# Patient Record
Sex: FEMALE | Race: WHITE | NOT HISPANIC OR LATINO | Employment: FULL TIME | URBAN - METROPOLITAN AREA
[De-identification: names, ages, dates, MRNs, and addresses within clinical notes are randomized per-mention and may not be internally consistent; named-entity substitution may affect disease eponyms.]

---

## 2017-01-09 ENCOUNTER — APPOINTMENT (OUTPATIENT)
Dept: ADMISSIONS | Facility: MEDICAL CENTER | Age: 32
End: 2017-01-09
Attending: SPECIALIST
Payer: COMMERCIAL

## 2017-01-09 RX ORDER — ZOLPIDEM TARTRATE 10 MG/1
10 TABLET ORAL NIGHTLY PRN
COMMUNITY

## 2017-01-12 ENCOUNTER — HOSPITAL ENCOUNTER (OUTPATIENT)
Facility: MEDICAL CENTER | Age: 32
End: 2017-01-12
Attending: SPECIALIST | Admitting: SPECIALIST
Payer: COMMERCIAL

## 2017-01-12 VITALS
HEART RATE: 61 BPM | BODY MASS INDEX: 24.39 KG/M2 | SYSTOLIC BLOOD PRESSURE: 115 MMHG | TEMPERATURE: 98.8 F | DIASTOLIC BLOOD PRESSURE: 69 MMHG | WEIGHT: 155.42 LBS | OXYGEN SATURATION: 95 % | RESPIRATION RATE: 18 BRPM | HEIGHT: 67 IN

## 2017-01-12 PROBLEM — Z41.1 ENCOUNTER FOR COSMETIC PROCEDURE: Status: ACTIVE | Noted: 2017-01-12

## 2017-01-12 LAB
HCG UR QL: NEGATIVE
SP GR UR REFRACTOMETRY: 1.02

## 2017-01-12 PROCEDURE — 700111 HCHG RX REV CODE 636 W/ 250 OVERRIDE (IP): Performed by: SPECIALIST

## 2017-01-12 PROCEDURE — 500817 HCHG MAMMARY SIZER: Performed by: SPECIALIST

## 2017-01-12 PROCEDURE — 502575 HCHG PACK, BREAST: Performed by: SPECIALIST

## 2017-01-12 PROCEDURE — A4606 OXYGEN PROBE USED W OXIMETER: HCPCS | Performed by: SPECIALIST

## 2017-01-12 PROCEDURE — 110371 HCHG SHELL REV 272: Performed by: SPECIALIST

## 2017-01-12 PROCEDURE — 160047 HCHG PACU  - EA ADDL 30 MINS PHASE II: Performed by: SPECIALIST

## 2017-01-12 PROCEDURE — 500122 HCHG BOVIE, BLADE: Performed by: SPECIALIST

## 2017-01-12 PROCEDURE — 501838 HCHG SUTURE GENERAL: Performed by: SPECIALIST

## 2017-01-12 PROCEDURE — 160046 HCHG PACU - 1ST 60 MINS PHASE II: Performed by: SPECIALIST

## 2017-01-12 PROCEDURE — A9270 NON-COVERED ITEM OR SERVICE: HCPCS

## 2017-01-12 PROCEDURE — 160028 HCHG SURGERY MINUTES - 1ST 30 MINS LEVEL 3: Performed by: SPECIALIST

## 2017-01-12 PROCEDURE — 81025 URINE PREGNANCY TEST: CPT

## 2017-01-12 PROCEDURE — 160035 HCHG PACU - 1ST 60 MINS PHASE I: Performed by: SPECIALIST

## 2017-01-12 PROCEDURE — 110382 HCHG SHELL REV 271: Performed by: SPECIALIST

## 2017-01-12 PROCEDURE — 700111 HCHG RX REV CODE 636 W/ 250 OVERRIDE (IP)

## 2017-01-12 PROCEDURE — 160039 HCHG SURGERY MINUTES - EA ADDL 1 MIN LEVEL 3: Performed by: SPECIALIST

## 2017-01-12 PROCEDURE — 500054 HCHG BANDAGE, ELASTIC 6: Performed by: SPECIALIST

## 2017-01-12 PROCEDURE — 700102 HCHG RX REV CODE 250 W/ 637 OVERRIDE(OP)

## 2017-01-12 PROCEDURE — 700101 HCHG RX REV CODE 250

## 2017-01-12 PROCEDURE — 160009 HCHG ANES TIME/MIN: Performed by: SPECIALIST

## 2017-01-12 PROCEDURE — 160002 HCHG RECOVERY MINUTES (STAT): Performed by: SPECIALIST

## 2017-01-12 PROCEDURE — 160025 RECOVERY II MINUTES (STATS): Performed by: SPECIALIST

## 2017-01-12 PROCEDURE — 160036 HCHG PACU - EA ADDL 30 MINS PHASE I: Performed by: SPECIALIST

## 2017-01-12 PROCEDURE — 160048 HCHG OR STATISTICAL LEVEL 1-5: Performed by: SPECIALIST

## 2017-01-12 DEVICE — IMPLANTABLE DEVICE: Type: IMPLANTABLE DEVICE | Status: FUNCTIONAL

## 2017-01-12 RX ORDER — CEFAZOLIN SODIUM 1 G/3ML
INJECTION, POWDER, FOR SOLUTION INTRAMUSCULAR; INTRAVENOUS
Status: DISCONTINUED | OUTPATIENT
Start: 2017-01-12 | End: 2017-01-12 | Stop reason: HOSPADM

## 2017-01-12 RX ORDER — SODIUM CHLORIDE, SODIUM LACTATE, POTASSIUM CHLORIDE, CALCIUM CHLORIDE 600; 310; 30; 20 MG/100ML; MG/100ML; MG/100ML; MG/100ML
INJECTION, SOLUTION INTRAVENOUS
Status: DISCONTINUED | OUTPATIENT
Start: 2017-01-12 | End: 2017-01-12 | Stop reason: HOSPADM

## 2017-01-12 RX ORDER — BUPIVACAINE HYDROCHLORIDE AND EPINEPHRINE 2.5; 5 MG/ML; UG/ML
INJECTION, SOLUTION EPIDURAL; INFILTRATION; INTRACAUDAL; PERINEURAL
Status: DISCONTINUED | OUTPATIENT
Start: 2017-01-12 | End: 2017-01-12 | Stop reason: HOSPADM

## 2017-01-12 RX ORDER — OXYCODONE HCL 5 MG/5 ML
SOLUTION, ORAL ORAL
Status: COMPLETED
Start: 2017-01-12 | End: 2017-01-12

## 2017-01-12 RX ORDER — MIDAZOLAM HYDROCHLORIDE 1 MG/ML
INJECTION INTRAMUSCULAR; INTRAVENOUS
Status: DISCONTINUED
Start: 2017-01-12 | End: 2017-01-12 | Stop reason: HOSPADM

## 2017-01-12 RX ORDER — GENTAMICIN SULFATE 40 MG/ML
INJECTION, SOLUTION INTRAMUSCULAR; INTRAVENOUS
Status: DISCONTINUED | OUTPATIENT
Start: 2017-01-12 | End: 2017-01-12 | Stop reason: HOSPADM

## 2017-01-12 RX ORDER — BACITRACIN 50000 [IU]/1
INJECTION, POWDER, FOR SOLUTION INTRAMUSCULAR
Status: DISCONTINUED | OUTPATIENT
Start: 2017-01-12 | End: 2017-01-12 | Stop reason: HOSPADM

## 2017-01-12 RX ADMIN — FENTANYL CITRATE 25 MCG: 50 INJECTION, SOLUTION INTRAMUSCULAR; INTRAVENOUS at 15:58

## 2017-01-12 RX ADMIN — FENTANYL CITRATE 50 MCG: 50 INJECTION, SOLUTION INTRAMUSCULAR; INTRAVENOUS at 15:34

## 2017-01-12 RX ADMIN — SODIUM CHLORIDE, POTASSIUM CHLORIDE, SODIUM LACTATE AND CALCIUM CHLORIDE: 600; 310; 30; 20 INJECTION, SOLUTION INTRAVENOUS at 11:25

## 2017-01-12 RX ADMIN — FENTANYL CITRATE 25 MCG: 50 INJECTION, SOLUTION INTRAMUSCULAR; INTRAVENOUS at 16:50

## 2017-01-12 RX ADMIN — FENTANYL CITRATE 25 MCG: 50 INJECTION, SOLUTION INTRAMUSCULAR; INTRAVENOUS at 15:27

## 2017-01-12 RX ADMIN — OXYCODONE HYDROCHLORIDE 10 MG: 5 SOLUTION ORAL at 16:45

## 2017-01-12 ASSESSMENT — PAIN SCALES - GENERAL
PAINLEVEL_OUTOF10: 4
PAINLEVEL_OUTOF10: ASSUMED PAIN PRESENT
PAINLEVEL_OUTOF10: 6
PAINLEVEL_OUTOF10: 8
PAINLEVEL_OUTOF10: 8

## 2017-01-12 NOTE — OR NURSING
"1511 To PACU from OR via gurney, side rails up x 2 for safety, lungs clear bilaterally, scds on patient and machine operational, dressing CDI to bilateral breasts, HOB elevated to 15 degrees. Pt does not arouse to voice or touch. Breathing easy and unlabored with OPA in place.   1525 Pt arouses easily to voice and pain rated as moderate and described as \"burning\". Plan to give IV Fentanyl for comfort. Denies nausea. Resting quietly on gurney.   1540 Mild snoring noted; resting quietly on gurney. Arouses to voice and reports pain as 4/10.   1555 Pt arouses to voice easily. Pain rated as increased to 8/10 and c/o burning. Denies nausea.   1600 Report to DIE[facility] Pretty.   "

## 2017-01-12 NOTE — IP AVS SNAPSHOT
" After Visit Summary                                                                                                                Name:Mary Escobar  Medical Record Number:[id number]  CSN: [id number]    YOB: 1985   Age: 31 y.o.  Sex: female  HT:1.702 m (5' 7\") WT: 70.5 kg (155 lb 6.8 oz)          Admit Date: 1/12/2017     Discharge Date:   Today's Date: 1/12/2017  Attending Doctor:  Charles Dean M.D.                  Allergies:  Review of patient's allergies indicates no known allergies.                Discharge Instructions         ACTIVITY: Rest and take it easy for the first 24 hours.  A responsible adult is recommended to remain with you during that time.  It is normal to feel sleepy.  We encourage you to not do anything that requires balance, judgment or coordination.    MILD FLU-LIKE SYMPTOMS ARE NORMAL. YOU MAY EXPERIENCE GENERALIZED MUSCLE ACHES, THROAT IRRITATION, HEADACHE AND/OR SOME NAUSEA.    FOR 24 HOURS DO NOT:  Drive, operate machinery or run household appliances.  Drink beer or alcoholic beverages.   Make important decisions or sign legal documents.    SPECIAL INSTRUCTIONS: Elevate head of bed 30 degrees for 48 hours (prop up on several pillows when resting or sleeping).    DIET: To avoid nausea, slowly advance diet as tolerated, avoiding spicy or greasy foods for the first day.  Add more substantial food to your diet according to your physician's instructions. INCREASE FLUIDS AND FIBER TO AVOID CONSTIPATION.    SURGICAL DRESSING/BATHING: Keep dressing clean, dry and on for 48 hours. Then may shower.     FOLLOW-UP APPOINTMENT:  A follow-up appointment should be arranged with your doctor in office as instructed; call to schedule.    You should CALL YOUR PHYSICIAN if you develop:  Fever greater than 101 degrees F.  Pain not relieved by medication, or persistent nausea or vomiting.  Excessive bleeding (blood soaking through dressing) or unexpected drainage from the wound.  Extreme " redness or swelling around the incision site, drainage of pus or foul smelling drainage.  Inability to urinate or empty your bladder within 8 hours.  Problems with breathing or chest pain.    You should call 911 if you develop problems with breathing or chest pain.  If you are unable to contact your doctor or surgical center, you should go to the nearest emergency room or urgent care center.  Dr Dean's telephone #: [phone]    If any questions arise, call your doctor.  If your doctor is not available, please feel free to call the Surgical Center at [phone].  The Center is open Monday through Friday from 7AM to 7PM.  You can also call the HEALTH HOTLINE open 24 hours/day, 7 days/week and speak to a nurse at [phone], or toll free at [phone].    A registered nurse may call you a few days after your surgery to see how you are doing after your procedure.    MEDICATIONS: Resume taking daily medication.  Take prescribed pain medication with food.  If no medication is prescribed, you may take non-aspirin pain medication if needed.  PAIN MEDICATION CAN BE VERY CONSTIPATING.  Take a stool softener or laxative such as senokot, pericolace, or milk of magnesia if needed.    Last pain medicine given at 4:45PM.  Start taking oral antibiotics at 8:20 PM    If your physician has prescribed pain medication that includes Acetaminophen (Tylenol), do not take additional Acetaminophen (Tylenol) while taking the prescribed medication.      Depression / Suicide Risk    As you are discharged from this [facility] facility, it is important to learn how to keep safe from harming yourself.    Recognize the warning signs:  · Abrupt changes in personality, positive or negative- including increase in energy   · Giving away possessions  · Change in eating patterns- significant weight changes-  positive or negative  · Change in sleeping patterns- unable to sleep or sleeping all the time   · Unwillingness or inability to  communicate  · Depression  · Unusual sadness, discouragement and loneliness  · Talk of wanting to die  · Neglect of personal appearance   · Rebelliousness- reckless behavior  · Withdrawal from people/activities they love  · Confusion- inability to concentrate     If you or a loved one observes any of these behaviors or has concerns about self-harm, here's what you can do:  · Talk about it- your feelings and reasons for harming yourself  · Remove any means that you might use to hurt yourself (examples: pills, rope, extension cords, firearm)  · Get professional help from the community (Mental Health, Substance Abuse, psychological counseling)  · Do not be alone:Call your Safe Contact- someone whom you trust who will be there for you.  · Call your local CRISIS HOTLINE 426-6026 or 585-411-4121  · Call your local Children's Mobile Crisis Response Team [place] (542) 532-0578 or www.DubMeNow  · Call the toll free National Suicide Prevention Hotlines   · National Suicide Prevention Lifeline 422-751-HVQM (5113)  · [place] Hope Line Network 800-SUICIDE (814-1594)       Medication List      CONTINUE taking these medications        Instructions    AMBIEN 10 MG Tabs   Generic drug:  zolpidem    Take 10 mg by mouth at bedtime as needed for Sleep.   Dose:  10 mg       DULCOLAX PO    Take 10 mg by mouth as needed.   Dose:  10 mg               Medication Information     Above and/or attached are the medications your physician expects you to take upon discharge. Review all of your home medications and newly ordered medications with your doctor and/or pharmacist. Follow medication instructions as directed by your doctor and/or pharmacist. Please keep your medication list with you and share with your physician. Update the information when medications are discontinued, doses are changed, or new medications (including over-the-counter products) are added; and carry medication information at all times in the event of  emergency situations.        Resources     Quit Smoking / Tobacco Use:    I understand the use of any tobacco products increases my chance of suffering from future heart disease or stroke and could cause other illnesses which may shorten my life. Quitting the use of tobacco products is the single most important thing I can do to improve my health. For further information on smoking / tobacco cessation call a Toll Free Quit Line at 1-874.964.8452 (*National [employer] [place]) or 1-529.629.3737 (American Lung Association) or you can access the web based program at www.lungusa.org.    [place] Tobacco Users Help Line:  (997) 283-3642       Toll Free: 1-911.581.2933  Quit Tobacco Program [facility] (103)597-9803    Crisis Hotline:    [place] Crisis Hotline:  3-072-IDPQJON or 1-625.240.8117    [place] Crisis Hotline:    1-241.542.5556 or 448-406-4080    Discharge Survey:   Thank you for choosing [facility]. We hope we did everything we could to make your hospital stay a pleasant one. You may be receiving a survey and we would appreciate your time and participation in answering the questions. Your input is very valuable to us in our efforts to improve our service to our patients and their families.            Signatures     My signature on this form indicates that:    1. I acknowledge receipt and understanding of these Home Care Instruction.  2. My questions regarding this information have been answered to my satisfaction.  3. I have formulated a plan with my discharge nurse to obtain my prescribed medications for home.    __________________________________      __________________________________                   Patient Signature                                 Guardian/Responsible Adult Signature      __________________________________                 __________       ________                       Nurse Signature                                               Date                 Time

## 2017-01-12 NOTE — IP AVS SNAPSHOT
1/12/2017          Mary Escobar  [address] [place] [address]    Dear Mary:    [facility] wants to ensure your discharge home is safe and you or your loved ones have had all your questions answered regarding your care after you leave the hospital.    You may receive a telephone call within two days of your discharge.  This call is to make certain you understand your discharge instructions as well as ensure we provided you with the best care possible during your stay with us.     The call will only last approximately 3-5 minutes and will be done by a nurse.    Once again, we want to ensure your discharge home is safe and that you have a clear understanding of any next steps in your care.  If you have any questions or concerns, please do not hesitate to contact us, we are here for you.  Thank you for choosing [facility] for your healthcare needs.    Sincerely,    Brando Saab    [facility]

## 2017-01-12 NOTE — OR SURGEON
Immediate Post-Operative Note      PreOp Diagnosis: capsules, desirer for larger implants    PostOp Diagnosis: same    Procedure(s) (LRB):  BREAST IMPLANT - REMOVE AND EXCHANGE (Bilateral)  CAPSULECTOMY W/CAPSULORRHAPHIES AND POSSIBLE PLACEMENT OF SERI SCAFFOLD (Bilateral)    Surgeon(s):  Charles Dean M.D.    Anesthesiologist/Type of Anesthesia:  Anesthesiologist: Soto Gross M.D./General    Surgical Staff:  Circulator: Jaqueline Gay R.N.  Relief Circulator: Lorene Martinez RLELA; Clyde Lo R.N.  Scrub Person: Sylwia Jean    Specimen:     Estimated Blood Loss: 75cc    Findings:     Complications: none        1/12/2017 3:17 PM Charles Daen

## 2017-01-13 NOTE — OR NURSING
"1630: To stage 2 from pacu - c/o intolerable \"burning\" pain. Denies nausea.  Anesthesia contacted, as did not previously have orders for PO. Also medicated with IV.   1700: Reports pain more tolerable. States ready to d/c home.  1715: Wheeled out to home     "

## 2017-01-13 NOTE — OP REPORT
DATE OF SERVICE:  01/12/2017    PREOPERATIVE DIAGNOSES:  1.  Bilateral breast capsular contracture, grade IV.  2.  Need for larger implants (need/desire for larger breast implants).  3.  Implants riding too high in breast pocket.  4.  Lateralization of breast implants.    POSTOPERATIVE DIAGNOSES:  1.  Bilateral breast capsular contracture, grade IV.  2.  Need for larger implants (need/desire for larger breast implants).  3.  Implants riding too high in breast pocket.  4.  Lateralization of breast implants.    OPERATIVE PROCEDURE:  1.  Bilateral implant removal exchange, placement of Allergan SRF gel implants   750 mL bilateral.  2.  Bilateral capsulectomies.  3.  Bilateral lateral capsulorrhaphies.  4.  Bilateral capsular rearrangement.  5.  Excision of left inferior scar with resection of redundant tissue.    SURGEON:  Charles Dean MD    ANESTHESIOLOGIST:  Soto Gross MD    ANESTHESIA:  General endotracheal tube.    COMPLICATIONS:  None.    ESTIMATED BLOOD LOSS:  100 mL.    INDICATIONS:  The patient is a 32-year-old female, who desires bilateral   breast revision.  She has had a previous full mastopexy and breast   augmentation after she had bariatric surgery.  Unfortunately, the implants   that were placed at that time seemed to be undersized and they did not fit the   patient's body.  She has severe lateralization of the implants per the   patient's history.  The right side has been fixed previously with a lateral   capsulorrhaphy, but has recurred.  The breast pockets also have fairly   significant capsular contracture and redundant skin in the inferior pole.    Upon revising both breasts and ____ out the implants for larger gel implants,   I discussed the risks and benefits with the patient in full.  She understands   and wished to proceed.    FINDINGS:  As above.    DESCRIPTION OF PROCEDURE:  The patient was preoperatively marked in the   holding room in standing position.  She was taken to the  operating theater   where general anesthesia was induced.  A 1 gram Ancef was given prior to   starting the procedure.  Starting on the right side, I injected 10 mL of 0.25%   Marcaine with epinephrine.  This was also done on the left breast.  Through   her previous mastopexy scar, I made an incision in the lower lateral scar on   the breast.  I dissected down to the breast tissue to identify the   pericapsular tissue.  This was opened and the implant was removed.  It turned   out to be a 275 mL saline implant filled to approximately that amount.  The   pocket was irrigated with copious amounts of normal saline solution.  I then   did a lateral capsulorrhaphy first by scoring the lateral pocket and then   using 2-0 Ethibond suture to reconstruct the lateral edge.  A double layer   closure was used on this right side as it was an area that had recurred   previously.  I did not feel that I needed ____ patch because the tissues did   hold a stitch very well.  Once I completed the lateral capsulorrhaphy, I did a   90% capsulectomy on the rest of the breast pocket.  I lowered the   inframammary fold on this right side and then used a saline sizer to  our   volume.  I felt that 750 mL gave her of the volume that she was looking for   and improve the cosmetic result.  We went over to the left breast and made an   incision out laterally like before, dissected down to the pericapsular tissue,   opened it, and again removed to 275 mL saline implants.  Lateral   capsulorrhaphy was performed with the 2-0 Ethibond suture and a 90%   capsulectomy was performed.  I opened an Allergan gel implant style    mL.  It was placed in the submuscular position through a Marion funnel on the   right breast.  I had already injected that breast pocket with 0.25% Marcaine   with epinephrine, approximately 25 mL for postoperative pain control.  Once   the implant was placed, minor pocket adjustments were made.  I then took that    saline sizer and blew it up on the left breast and I felt that the breasts   were even and after use the same size implants.  So a second Allergan gel SRF   implant was opened.  It was soaked in antibiotic solution and the pocket was   irrigated with normal saline solution with triple antibiotic added.  I   injected 25 mL of 0.25% Marcaine with epinephrine and then I placed an   Allergan 750 mL SRF gel implant into the submuscular position through a Marion   funnel.  Patient was sat up, pocket adjustments were made.  I felt that the   left breast had redundant skin hanging down and after several attempts at   rearrangement, it just did not match the right side, so I resected some extra   skin along the inferior incision on the left breast, closed the deep dermal   layer with 3-0 and 2-0 Vicryl sutures and then closed the skin with a running   3-0 Monoderm double layer closure.  That did improve the gentle curve of the   inframammary fold on the left side and made a significant improvement in   regards to matching the right side.  Once both incisions were closed,   Steri-Strips were placed over the incision sites, 2-inch foam tape was placed   around the breasts, and she was placed in a comfort supportive bra.  I wrapped   her with a Shur-Band Ace wrap for postoperative compression.  She was   returned to the PACU in stable condition.       ____________________________________     MD BURTON WHALEY / SYLVIA    DD:  01/12/2017 17:52:47  DT:  01/12/2017 19:01:52    D#:  [id number]  Job#:  [id number]

## 2017-01-13 NOTE — DISCHARGE INSTRUCTIONS
ACTIVITY: Rest and take it easy for the first 24 hours.  A responsible adult is recommended to remain with you during that time.  It is normal to feel sleepy.  We encourage you to not do anything that requires balance, judgment or coordination.    MILD FLU-LIKE SYMPTOMS ARE NORMAL. YOU MAY EXPERIENCE GENERALIZED MUSCLE ACHES, THROAT IRRITATION, HEADACHE AND/OR SOME NAUSEA.    FOR 24 HOURS DO NOT:  Drive, operate machinery or run household appliances.  Drink beer or alcoholic beverages.   Make important decisions or sign legal documents.    SPECIAL INSTRUCTIONS: Elevate head of bed 30 degrees for 48 hours (prop up on several pillows when resting or sleeping).    DIET: To avoid nausea, slowly advance diet as tolerated, avoiding spicy or greasy foods for the first day.  Add more substantial food to your diet according to your physician's instructions. INCREASE FLUIDS AND FIBER TO AVOID CONSTIPATION.    SURGICAL DRESSING/BATHING: Keep dressing clean, dry and on for 48 hours. Then may shower.     FOLLOW-UP APPOINTMENT:  A follow-up appointment should be arranged with your doctor in office as instructed; call to schedule.    You should CALL YOUR PHYSICIAN if you develop:  Fever greater than 101 degrees F.  Pain not relieved by medication, or persistent nausea or vomiting.  Excessive bleeding (blood soaking through dressing) or unexpected drainage from the wound.  Extreme redness or swelling around the incision site, drainage of pus or foul smelling drainage.  Inability to urinate or empty your bladder within 8 hours.  Problems with breathing or chest pain.    You should call 911 if you develop problems with breathing or chest pain.  If you are unable to contact your doctor or surgical center, you should go to the nearest emergency room or urgent care center.  Dr Dean's telephone #: [phone]    If any questions arise, call your doctor.  If your doctor is not available, please feel free to call the Surgical Center at  [phone].  The Center is open Monday through Friday from 7AM to 7PM.  You can also call the HEALTH HOTLINE open 24 hours/day, 7 days/week and speak to a nurse at [phone], or toll free at [phone].    A registered nurse may call you a few days after your surgery to see how you are doing after your procedure.    MEDICATIONS: Resume taking daily medication.  Take prescribed pain medication with food.  If no medication is prescribed, you may take non-aspirin pain medication if needed.  PAIN MEDICATION CAN BE VERY CONSTIPATING.  Take a stool softener or laxative such as senokot, pericolace, or milk of magnesia if needed.    Last pain medicine given at 4:45PM.  Start taking oral antibiotics at 8:20 PM    If your physician has prescribed pain medication that includes Acetaminophen (Tylenol), do not take additional Acetaminophen (Tylenol) while taking the prescribed medication.      Depression / Suicide Risk    As you are discharged from this [facility] facility, it is important to learn how to keep safe from harming yourself.    Recognize the warning signs:  · Abrupt changes in personality, positive or negative- including increase in energy   · Giving away possessions  · Change in eating patterns- significant weight changes-  positive or negative  · Change in sleeping patterns- unable to sleep or sleeping all the time   · Unwillingness or inability to communicate  · Depression  · Unusual sadness, discouragement and loneliness  · Talk of wanting to die  · Neglect of personal appearance   · Rebelliousness- reckless behavior  · Withdrawal from people/activities they love  · Confusion- inability to concentrate     If you or a loved one observes any of these behaviors or has concerns about self-harm, here's what you can do:  · Talk about it- your feelings and reasons for harming yourself  · Remove any means that you might use to hurt yourself (examples: pills, rope, extension cords, firearm)  · Get  professional help from the community (Mental Health, Substance Abuse, psychological counseling)  · Do not be alone:Call your Safe Contact- someone whom you trust who will be there for you.  · Call your local CRISIS HOTLINE 187-1946 or 906-939-9977  · Call your local Children's Mobile Crisis Response Team [place] (955) 135-9349 or www.Musiwave  · Call the toll free National Suicide Prevention Hotlines   · National Suicide Prevention Lifeline 186-124-FCZU (3533)  · National Hope Line Network 800-SUICIDE (555-6198)

## 2017-09-05 ENCOUNTER — HOSPITAL ENCOUNTER (OUTPATIENT)
Dept: HOSPITAL 8 - RAD | Age: 32
Discharge: HOME | End: 2017-09-05
Attending: INTERNAL MEDICINE
Payer: OTHER GOVERNMENT

## 2017-09-05 DIAGNOSIS — Z98.84: ICD-10-CM

## 2017-09-05 DIAGNOSIS — K21.9: Primary | ICD-10-CM

## 2017-09-05 PROCEDURE — 74241: CPT

## 2017-11-28 ENCOUNTER — APPOINTMENT (OUTPATIENT)
Dept: ADMISSIONS | Facility: MEDICAL CENTER | Age: 32
End: 2017-11-28
Attending: SPECIALIST
Payer: COMMERCIAL

## 2017-11-30 ENCOUNTER — HOSPITAL ENCOUNTER (OUTPATIENT)
Facility: MEDICAL CENTER | Age: 32
End: 2017-11-30
Attending: SPECIALIST | Admitting: SPECIALIST
Payer: COMMERCIAL

## 2017-11-30 VITALS
WEIGHT: 171.74 LBS | TEMPERATURE: 97.6 F | SYSTOLIC BLOOD PRESSURE: 101 MMHG | HEART RATE: 62 BPM | OXYGEN SATURATION: 97 % | BODY MASS INDEX: 26.03 KG/M2 | HEIGHT: 68 IN | RESPIRATION RATE: 16 BRPM | DIASTOLIC BLOOD PRESSURE: 59 MMHG

## 2017-11-30 LAB
B-HCG FREE SERPL-ACNC: <5 MIU/ML
IHCGL IHCGL: NEGATIVE MIU/ML

## 2017-11-30 PROCEDURE — 501838 HCHG SUTURE GENERAL: Performed by: SPECIALIST

## 2017-11-30 PROCEDURE — 700101 HCHG RX REV CODE 250

## 2017-11-30 PROCEDURE — 160047 HCHG PACU  - EA ADDL 30 MINS PHASE II: Performed by: SPECIALIST

## 2017-11-30 PROCEDURE — 160009 HCHG ANES TIME/MIN: Performed by: SPECIALIST

## 2017-11-30 PROCEDURE — 500122 HCHG BOVIE, BLADE: Performed by: SPECIALIST

## 2017-11-30 PROCEDURE — 160039 HCHG SURGERY MINUTES - EA ADDL 1 MIN LEVEL 3: Performed by: SPECIALIST

## 2017-11-30 PROCEDURE — 502000 HCHG MISC OR IMPLANTS RC 0278: Performed by: SPECIALIST

## 2017-11-30 PROCEDURE — 502575 HCHG PACK, BREAST: Performed by: SPECIALIST

## 2017-11-30 PROCEDURE — 700102 HCHG RX REV CODE 250 W/ 637 OVERRIDE(OP)

## 2017-11-30 PROCEDURE — 160002 HCHG RECOVERY MINUTES (STAT): Performed by: SPECIALIST

## 2017-11-30 PROCEDURE — 160046 HCHG PACU - 1ST 60 MINS PHASE II: Performed by: SPECIALIST

## 2017-11-30 PROCEDURE — 160028 HCHG SURGERY MINUTES - 1ST 30 MINS LEVEL 3: Performed by: SPECIALIST

## 2017-11-30 PROCEDURE — A9270 NON-COVERED ITEM OR SERVICE: HCPCS

## 2017-11-30 PROCEDURE — 160025 RECOVERY II MINUTES (STATS): Performed by: SPECIALIST

## 2017-11-30 PROCEDURE — 160048 HCHG OR STATISTICAL LEVEL 1-5: Performed by: SPECIALIST

## 2017-11-30 PROCEDURE — 700111 HCHG RX REV CODE 636 W/ 250 OVERRIDE (IP)

## 2017-11-30 PROCEDURE — 160035 HCHG PACU - 1ST 60 MINS PHASE I: Performed by: SPECIALIST

## 2017-11-30 PROCEDURE — 84702 CHORIONIC GONADOTROPIN TEST: CPT

## 2017-11-30 DEVICE — IMPLANTABLE DEVICE: Type: IMPLANTABLE DEVICE | Site: BREAST | Status: FUNCTIONAL

## 2017-11-30 RX ORDER — BUPIVACAINE HYDROCHLORIDE AND EPINEPHRINE 2.5; 5 MG/ML; UG/ML
INJECTION, SOLUTION EPIDURAL; INFILTRATION; INTRACAUDAL; PERINEURAL
Status: DISCONTINUED | OUTPATIENT
Start: 2017-11-30 | End: 2017-11-30 | Stop reason: HOSPADM

## 2017-11-30 RX ORDER — BACITRACIN 50000 [IU]/1
INJECTION, POWDER, FOR SOLUTION INTRAMUSCULAR
Status: DISCONTINUED | OUTPATIENT
Start: 2017-11-30 | End: 2017-11-30 | Stop reason: HOSPADM

## 2017-11-30 RX ORDER — SODIUM CHLORIDE, SODIUM LACTATE, POTASSIUM CHLORIDE, CALCIUM CHLORIDE 600; 310; 30; 20 MG/100ML; MG/100ML; MG/100ML; MG/100ML
1000 INJECTION, SOLUTION INTRAVENOUS
Status: ACTIVE | OUTPATIENT
Start: 2017-11-30 | End: 2017-11-30

## 2017-11-30 RX ORDER — GENTAMICIN SULFATE 40 MG/ML
INJECTION, SOLUTION INTRAMUSCULAR; INTRAVENOUS
Status: DISCONTINUED | OUTPATIENT
Start: 2017-11-30 | End: 2017-11-30 | Stop reason: HOSPADM

## 2017-11-30 RX ORDER — CEFAZOLIN SODIUM 1 G/3ML
INJECTION, POWDER, FOR SOLUTION INTRAMUSCULAR; INTRAVENOUS
Status: DISCONTINUED | OUTPATIENT
Start: 2017-11-30 | End: 2017-11-30 | Stop reason: HOSPADM

## 2017-11-30 RX ORDER — OXYCODONE HCL 5 MG/5 ML
SOLUTION, ORAL ORAL
Status: COMPLETED
Start: 2017-11-30 | End: 2017-11-30

## 2017-11-30 RX ADMIN — HYDROMORPHONE HYDROCHLORIDE 0.5 MG: 1 INJECTION, SOLUTION INTRAMUSCULAR; INTRAVENOUS; SUBCUTANEOUS at 18:02

## 2017-11-30 RX ADMIN — FENTANYL CITRATE 25 MCG: 50 INJECTION, SOLUTION INTRAMUSCULAR; INTRAVENOUS at 17:20

## 2017-11-30 RX ADMIN — OXYCODONE HYDROCHLORIDE 10 MG: 5 SOLUTION ORAL at 17:10

## 2017-11-30 RX ADMIN — FENTANYL CITRATE 25 MCG: 50 INJECTION, SOLUTION INTRAMUSCULAR; INTRAVENOUS at 17:15

## 2017-11-30 RX ADMIN — FENTANYL CITRATE 50 MCG: 50 INJECTION, SOLUTION INTRAMUSCULAR; INTRAVENOUS at 17:10

## 2017-11-30 ASSESSMENT — PAIN SCALES - GENERAL
PAINLEVEL_OUTOF10: 4
PAINLEVEL_OUTOF10: 7
PAINLEVEL_OUTOF10: 8
PAINLEVEL_OUTOF10: 6

## 2017-12-01 NOTE — DISCHARGE INSTRUCTIONS
ACTIVITY: Rest and take it easy for the first 24 hours.  A responsible adult is recommended to remain with you during that time.  It is normal to feel sleepy.  We encourage you to not do anything that requires balance, judgment or coordination.    MILD FLU-LIKE SYMPTOMS ARE NORMAL. YOU MAY EXPERIENCE GENERALIZED MUSCLE ACHES, THROAT IRRITATION, HEADACHE AND/OR SOME NAUSEA.    FOR 24 HOURS DO NOT:  Drive, operate machinery or run household appliances.  Drink beer or alcoholic beverages.   Make important decisions or sign legal documents.    SPECIAL INSTRUCTIONS:  May remove dressing and shower in 48 hours. Leave ace wrap in place for 48 hours. Sleep/rest with head elevated at least 30 degrees (ie well propped up with pillows in bed or in recliner chair)    DIET: To avoid nausea, slowly advance diet as tolerated, avoiding spicy or greasy foods for the first day.  Add more substantial food to your diet according to your physician's instructions.  Babies can be fed formula or breast milk as soon as they are hungry.  INCREASE FLUIDS AND FIBER TO AVOID CONSTIPATION.        FOLLOW-UP APPOINTMENT:  A follow-up appointment should be arranged with your doctor ; call to schedule.    You should CALL YOUR PHYSICIAN if you develop:  Fever greater than 101 degrees F.  Pain not relieved by medication, or persistent nausea or vomiting.  Excessive bleeding (blood soaking through dressing) or unexpected drainage from the wound.  Extreme redness or swelling around the incision site, drainage of pus or foul smelling drainage.  Inability to urinate or empty your bladder within 8 hours.  Problems with breathing or chest pain.    You should call 911 if you develop problems with breathing or chest pain.  If you are unable to contact your doctor or surgical center, you should go to the nearest emergency room or urgent care center.  Physician's telephone #: [phone]    If any questions arise, call your doctor.  If your doctor is not  available, please feel free to call the Surgical Center at [phone].  The Center is open Monday through Friday from 7AM to 7PM.  You can also call the HEALTH HOTLINE open 24 hours/day, 7 days/week and speak to a nurse at [phone], or toll free at [phone].    A registered nurse may call you a few days after your surgery to see how you are doing after your procedure.    MEDICATIONS: Resume taking daily medication.  Take prescribed pain medication with food.  If no medication is prescribed, you may take non-aspirin pain medication if needed.  PAIN MEDICATION CAN BE VERY CONSTIPATING.  Take a stool softener or laxative such as senokot, pericolace, or milk of magnesia if needed.    Prescription given for Preoperatively .  Last pain medication given at 5:10 pm 10 mg oxycodone     If your physician has prescribed pain medication that includes Acetaminophen (Tylenol), do not take additional Acetaminophen (Tylenol) while taking the prescribed medication.    Depression / Suicide Risk    As you are discharged from this [facility] facility, it is important to learn how to keep safe from harming yourself.    Recognize the warning signs:  · Abrupt changes in personality, positive or negative- including increase in energy   · Giving away possessions  · Change in eating patterns- significant weight changes-  positive or negative  · Change in sleeping patterns- unable to sleep or sleeping all the time   · Unwillingness or inability to communicate  · Depression  · Unusual sadness, discouragement and loneliness  · Talk of wanting to die  · Neglect of personal appearance   · Rebelliousness- reckless behavior  · Withdrawal from people/activities they love  · Confusion- inability to concentrate     If you or a loved one observes any of these behaviors or has concerns about self-harm, here's what you can do:  · Talk about it- your feelings and reasons for harming yourself  · Remove any means that you might use to hurt  yourself (examples: pills, rope, extension cords, firearm)  · Get professional help from the community (Mental Health, Substance Abuse, psychological counseling)  · Do not be alone:Call your Safe Contact- someone whom you trust who will be there for you.  · Call your local CRISIS HOTLINE 376-6903 or 184-781-5999  · Call your local Children's Mobile Crisis Response Team [place] (987) 601-0994 or www.[employer]  · Call the toll free National Suicide Prevention Hotlines   · National Suicide Prevention Lifeline 096-223-LIZN (1180)  · National Hope Line Network 800-SUICIDE (802-2086)

## 2017-12-01 NOTE — OR SURGEON
Immediate Post OP Note    PreOp Diagnosis: right breast implant bottoming out    PostOp Diagnosis: same    Procedure(s):  CAPSULECTOMY - FOR LATERAL CAPSULORRHAPHY W/PLACEMENT OF SERI SCAFFOLD - Wound Class: Clean    Surgeon(s):  Charles Dean M.D.    Anesthesiologist/Type of Anesthesia:  Anesthesiologist: Soto Gross M.D./General    Surgical Staff:  Circulator: Floresita Bullock R.N.  Scrub Person: Sylwia Jean    Specimens:  * No specimens in log *    Estimated Blood Loss: 75cc    Findings:     Complications: none        11/30/2017 5:00 PM Charles Dean

## 2017-12-01 NOTE — OR NURSING
Dressing clean and dry, bra in place.  C/O increased pain when getting dressed, Dilaudid with good effect.  Patient and her  verbalize good understanding of discharge instructions.

## 2017-12-01 NOTE — OR NURSING
1656 To PACU from OR via gurney, sleeping, respirations spontaneous and non-labored via OPA. OPA dc'd upon arrival. VSS. Pt drowsy at this time Dressing to right breast covered with surgical bra and ace wrap. No apparent drainage   1710 Pt more awake at this time. VSS. Pt c/o 7/10 pain. Plan to medicate. See MAR   1725 Pt states pain is tolerable at this time. VSS.  1740 No change. Pt meets criteria for stage two at this time. VSS.

## 2017-12-01 NOTE — OP REPORT
DATE OF SERVICE:  11/30/2017    PREOPERATIVE DIAGNOSIS:  Right breast implant bottoming out/lateralization.    POSTOPERATIVE DIAGNOSIS:  Right breast implant bottoming out/lateralization.    OPERATIVE PROCEDURE:  1.  Right breast lateral inferior capsulorrhaphy.  2.  Placement of SERI scaffold material lateral and inferior.  3.  Superior capsulotomy.    SURGEON:  Charles Dean MD    ANESTHESIOLOGIST:  Soto Gross MD    ANESTHESIA:  General endotracheal tube.    COMPLICATIONS:  None.    ESTIMATED BLOOD LOSS:  Less than 75 mL    INDICATIONS:  The patient is a 31-year-old female who has previously undergone   a full mastopexy and breast augmentation.  Most recently, I have performed an   implant removal exchange and placement of larger implants and a retightening   of her inframammary scar.  Unfortunately, the right breast implant has   bottomed out and lateralized and is in need of capsulorrhaphy due to how far   she has stretched.  I planned on using a piece of SERI surgical scaffolding.    The patient is aware of the risks and benefits and wished to proceed.    FINDINGS:  The lateral capsulorrhaphy that I performed on her previous   procedure still intact.  The surrounding capsular tissue is essentially what   has re-stretched.  She definitely has a tight pectoralis major muscle that is   contributing to this.    PROCEDURE IN DETAIL:  The patient was preoperatively marked in the holding   room in standing position.  She was taken to operating theater where general   anesthesia was induced.  Ancef 2 g were given prior to starting the procedure.    Starting on the right side, I injected 10 mL of 0.25% Marcaine with   epinephrine into the inferior lateral incision area.  I went through the old   incision, dissected down to the breast tissue with electrocautery, opened up   the pericapsular tissue and removed the gel implant, which was an Allergan 750   mL SRX gel implant.  This was placed in a sterile container  with saline   solution.  Initially, I irrigated the pocket and then I scored the lateral   pocket which was definitely opened up.  The previous capsulorrhaphy sutures   were still there and intact.  I basically went over the top of those and   retightened the lateral capsulorrhaphy with 2-0 Ethibond suture.  Fairly good   robust bites of the lateral serratus anterior muscle was taken in hopes of   getting a little more strength on the repair.  Inferiorly, definitely I could   see where the capsule just stretched out.  Again using the 2-0 Ethibond   suture, I did inferior capsulorrhaphy after I scored the capsular tissue.  I   replaced the implant into the breast pocket and sat the patient up.  I did   have to do some pocket adjustments until I was satisfied with where the   implant was sitting.  I could definitely tell that there was pressure from the   superior aspect, so I took the implant back out, opened up the superior   capsular tissue and even released more of the pectoralis major muscle up in   the superior aspect trying to lessen the force that was having on the implant   itself.  I then decided to use a piece of series scaffolding.  I had a 10 x 8   cm piece and cut in half.  I used half on the lateral aspect, suturing in   place with 2-0 Vicryl sutures and then the other half I used inferiorly,   sutured in place with 2-0 Vicryl sutures.  The implant was replaced into the   submuscular position through a Marion funnel.  Patient was sat up.  Once I was   satisfied with the position of the implant, the incision was closed with 2-0   and 3-0 Vicryl sutures and then I closed the skin with a 3-0 Stratafix barbed   suture in a subcuticular fashion.  Steri-Strips were applied to the incision.    She was placed in a comfort supportive bra.  I wrapped her with a sure band   Ace wraps for compression.       ____________________________________     MD BUROTN WHALEY / SYLVIA    DD:  11/30/2017 18:35:48  DT:   11/30/2017 20:35:00    D#:  [id number]  Job#:  [id number]

## (undated) DEVICE — GOWN WARMING STANDARD FLEX - (30/CA)

## (undated) DEVICE — BAG, SPONGE COUNT 50600

## (undated) DEVICE — GLOVE, LITE (PAIR)

## (undated) DEVICE — MASK ANESTHESIA ADULT  - (100/CA)

## (undated) DEVICE — PACK BREAST SM OR - (1EA/CA)

## (undated) DEVICE — GOWN SURGICAL XX-LARGE - (28EA/CA) SIRUS NON REINFORCED

## (undated) DEVICE — GLOVE BIOGEL PI ULTRATOUCH SZ 7.0 SURGICAL PF LF- POWDER FREE (50/BX 4BX/CA)

## (undated) DEVICE — TRAY SKIN SCRUB PVP WET (20EA/CA) PART #DYND70356 DISCONTINUED

## (undated) DEVICE — MASK, LARYNGEAL AIRWAY #4

## (undated) DEVICE — KIT ANESTHESIA W/CIRCUIT & 3/LT BAG W/FILTER (20EA/CA)

## (undated) DEVICE — HUMID-VENT HEAT AND MOISTURE EXCHANGE- (50/BX)

## (undated) DEVICE — IMPLANT BREAST MP PLUS SIZER 800CC

## (undated) DEVICE — SENSOR SPO2 NEO LNCS ADHESIVE (20/BX) SEE USER NOTES

## (undated) DEVICE — SPONGE GAUZESTER 4 X 4 4PLY - (128PK/CA)

## (undated) DEVICE — LACTATED RINGERS INJ 1000 ML - (14EA/CA 60CA/PF)

## (undated) DEVICE — NEPTUNE 4 PORT MANIFOLD - (20/PK)

## (undated) DEVICE — ELECTRODE DUAL RETURN W/ CORD - (50/PK)

## (undated) DEVICE — BANDAGE ELASTIC DOUBLE 6X10 - (6EA/BX)"

## (undated) DEVICE — MASK AIRWAY SIZE 4 UNIQUE SILICON (10EA/BX)

## (undated) DEVICE — ADHESIVE MASTISOL - (48/BX)

## (undated) DEVICE — SUTURE 3-0 VICRYL PLUSPS-2 - 18 INCH (36/BX)

## (undated) DEVICE — PROTECTOR ULNA NERVE - (36PR/CA)

## (undated) DEVICE — CANISTER SUCTION RIGID RED 1500CC (40EA/CA)

## (undated) DEVICE — ELECTRODE 850 FOAM ADHESIVE - HYDROGEL RADIOTRNSPRNT (50/PK)

## (undated) DEVICE — BOVIE BLADE COATED &INSULATED (50EA/PK)

## (undated) DEVICE — SUTURE 2-0 ETHIBOND GREEN CT-2 TAPER (36PK/BX)

## (undated) DEVICE — SUTURE 4-0 30CM X 30CM STRATAFIX SPRIAL PGA/PCL CLR FS-2 NEEDLE (12/BX)

## (undated) DEVICE — SUCTION INSTRUMENT YANKAUER BULBOUS TIP W/O VENT (50EA/CA)

## (undated) DEVICE — SOD. CHL. INJ. 0.9% 1000 ML - (14EA/CA 60CA/PF)

## (undated) DEVICE — GLOVE BIOGEL SZ 8.5 SURGICAL PF LTX - (50PR/BX 4BX/CA)

## (undated) DEVICE — HEAD HOLDER JUNIOR/ADULT

## (undated) DEVICE — TUBE CONNECTING SUCTION - CLEAR PLASTIC STERILE 72 IN (50EA/CA)

## (undated) DEVICE — KIT ROOM DECONTAMINATION

## (undated) DEVICE — SUTURE 2-0 VICRYL PLUS CT-2 - 27 INCH (36/BX)

## (undated) DEVICE — WATER IRRIGATION STERILE 1000ML (12EA/CA)

## (undated) DEVICE — GLOVE BIOGEL PI ULTRATOUCH SZ 7.5 SURGICAL PF LF -(50/BX 4BX/CA)

## (undated) DEVICE — SODIUM CHL IRRIGATION 0.9% 1000ML (12EA/CA)

## (undated) DEVICE — SUTURE GENERAL

## (undated) DEVICE — SUTURE 2-0 VICRYL PLUS CT-1 36 (36PK/BX)"

## (undated) DEVICE — GLOVE BIOGEL SZ 7 SURGICAL PF LTX - (50PR/BX 4BX/CA)